# Patient Record
(demographics unavailable — no encounter records)

---

## 2025-03-04 NOTE — ASSESSMENT
[FreeTextEntry1] : 34 year old male with hx of diverticulitis s/p resection (2020), ulcerative colitis (dx 2020) , lumbar spine arthritis /stenosis,    Patient saw Dr. Rees in Spring 2024 for chronic back pain and radicular symptoms on left side. He was told he has  disc bulge at L5-S1 and  moderate left greater than right foraminal stenosis He tried to do physical therapy,  He was also told to see pain management and had L5-S1 HETAL which helped a little.  He  also had a nerve block in 2/2025 with pain management  He also reports on Nusenta per pain management. He reports all the symptoms after being hospitalized for over 4 months during the pandemic (COVID, diverticulitis ) He has not seen GI recently. He reports he sometimes has some intermittent episodes of abdominal pain and loose stools, sometimes with dark stools - however, no recent episodes per patient Chronic fatigue  Patient does not have signs of underlying rheumatological connective tissue diseases (CTDs) including inflammatory joint pain, malar rash, photosensitivity, sicca symptoms, Raynauds.. Exam without synovitis, dactylitis, enthesitis, rashes, psoriasis, changes of Raynauds.  He had negative JULIEN, RF in 6/2024 His MRI does not know signs of inflammatory arthritis. He does have concerns disc bulge at L5-S1 and  moderate left greater than right foraminal stenosis which were addressed by spine specialist and he was told by them to see pain management who he following with now and has improved back  pain with Nusenta  and apparently nerve block per patient.  I do recommend seeing GI for hx of ulcerative colitis for evaluation of abdominal symptoms and any underlying inflammation that might be a result of IBD. Of note, no joint inflammation on my exam today. I also recommend seeing ophtho for baseline eye exam in the setting of hx of  ulcerative colitis Continue follow up with pain management for back pain.    Total time spent in review of patient history, clinical exam, management, counseling, and plan of care:  45min

## 2025-03-04 NOTE — PHYSICAL EXAM
[TextEntry] :   GENERAL: Appears in no acute distress HEENT: EOMI. No conjunctival erythema. Moist mucous membranes. No nasopharyngeal ulcers NECK: Supple, no cervical lymphadenopathy CARDIOVASCULAR: RRR. S1, S2  MSK: No active synovitis, swelling, erythema, or warmth. No joint tenderness to palpation. No Bouchards or Heberdens nodes No deformities. Normal ROM of neck, back, b/l upper and lower extremities. No dactylitis, enthesitis, nail pitting SKIN: No lesions or rashes NEURO: No focal deficits. Motor strength 5/5 in major muscle groups of b/l UE and LE. Sensation to soft touch intact in major dermatomes of b/l UE and LE. PSYCH: . Normal affect and thought process.

## 2025-03-04 NOTE — HISTORY OF PRESENT ILLNESS
[FreeTextEntry1] : 34 year old male with hx of diverticulitis s/p resection (2020), ulcerative colitis (dx 2020) , lumbar spine arthritis /stenosis,   Referred by pain management (Arcadio Stanton)  for back pain.  Patient saw Dr. Rees in Spring 2024 for chronic back pain and radicular symptoms on left side. He was told he has  disc bulge at L5-S1 and  moderate left greater than right foraminal stenosis  He tried to do physical therapy,  He was also told to see pain management and had L5-S1 HETAL which helped a little.  He  also had a nerve block in 2/2025 with pain management  He also reports on Nusenta per pain management. He reports all the symptoms after being hospitalized for over 4 months during the pandemic (COVID, diverticulitis ) He has not seen GI recently- however, no recent episodes per patient He reports he sometimes has some intermittent episodes of abdominal pain and loose stools, sometimes with dark stools Chronic fatigue Patient denies other joint pains, joint swelling, joint erythema/warmth,  fatigue, fever, chills, weight loss, nasopharyngeal ulcers, chest pain, cough, SOB, nausea, vomiting, diarrhea, ,dysuria, hematuria, rash, , Raynaud's, alopecia, dry eyes, dry mouth, eye pain/redness, , myalgias, muscle weakness, dysphagia, jaw claudication,  6/2024 neg JULIEN, RF   PMHx: As above PSHx: diverticulitis s/p partial colon resection (2020), Family Hx: Denies family history of rheumatologic conditions including RA, SLE, Sjogren's, Myositis, scleroderma, or vasculitis Social Hx:  Smoking Hx:  current EtOH Hx: social Drug use: denies Occupation: market development , 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